# Patient Record
Sex: FEMALE | URBAN - METROPOLITAN AREA
[De-identification: names, ages, dates, MRNs, and addresses within clinical notes are randomized per-mention and may not be internally consistent; named-entity substitution may affect disease eponyms.]

---

## 2021-07-13 ENCOUNTER — APPOINTMENT (RX ONLY)
Dept: URBAN - METROPOLITAN AREA CLINIC 150 | Facility: CLINIC | Age: 46
Setting detail: DERMATOLOGY
End: 2021-07-13

## 2021-07-13 DIAGNOSIS — L29.89 OTHER PRURITUS: ICD-10-CM

## 2021-07-13 DIAGNOSIS — L29.8 OTHER PRURITUS: ICD-10-CM

## 2021-07-13 DIAGNOSIS — L50.1 IDIOPATHIC URTICARIA: ICD-10-CM

## 2021-07-13 DIAGNOSIS — L50.3 DERMATOGRAPHIC URTICARIA: ICD-10-CM

## 2021-07-13 PROCEDURE — ? COUNSELING

## 2021-07-13 PROCEDURE — 99204 OFFICE O/P NEW MOD 45 MIN: CPT

## 2021-07-13 PROCEDURE — ? TREATMENT REGIMEN

## 2021-07-13 PROCEDURE — ? PRESCRIPTION

## 2021-07-13 RX ORDER — CETIRIZINE HCL 1 MG/ML
SOLUTION, ORAL ORAL QD
Qty: 30 | Refills: 3 | Status: ERX | COMMUNITY
Start: 2021-07-13

## 2021-07-13 RX ORDER — PREDNISONE 20 MG/1
TABLET ORAL
Qty: 8 | Refills: 0 | Status: ERX | COMMUNITY
Start: 2021-07-13

## 2021-07-13 RX ADMIN — Medication: at 00:00

## 2021-07-13 RX ADMIN — PREDNISONE: 20 TABLET ORAL at 00:00

## 2021-07-13 ASSESSMENT — LOCATION DETAILED DESCRIPTION DERM
LOCATION DETAILED: LEFT ANTERIOR PROXIMAL THIGH
LOCATION DETAILED: RIGHT ANTERIOR PROXIMAL THIGH
LOCATION DETAILED: LEFT INFERIOR MEDIAL MIDBACK
LOCATION DETAILED: LEFT PROXIMAL POSTERIOR UPPER ARM
LOCATION DETAILED: RIGHT PROXIMAL POSTERIOR UPPER ARM

## 2021-07-13 ASSESSMENT — LOCATION SIMPLE DESCRIPTION DERM
LOCATION SIMPLE: RIGHT THIGH
LOCATION SIMPLE: RIGHT POSTERIOR UPPER ARM
LOCATION SIMPLE: LEFT POSTERIOR UPPER ARM
LOCATION SIMPLE: LEFT THIGH
LOCATION SIMPLE: LEFT LOWER BACK

## 2021-07-13 ASSESSMENT — LOCATION ZONE DERM
LOCATION ZONE: ARM
LOCATION ZONE: TRUNK
LOCATION ZONE: LEG

## 2021-07-13 NOTE — HPI: HIVES (URTICARIA)
How Severe Are Your Hives?: moderate
Please Select The Phrase That Best Describes Your Hives.: individual welts do not stay in the same place for more than 24 hours
Is This A New Presentation, Or A Follow-Up?: Hives
Additional History: Patient was seen in the E.R. last night. She was given oral steroids as well as cetirizine. She has not picked up these scripts yet. She was seen at [facility] over the last three weeks as well.

## 2021-07-13 NOTE — PROCEDURE: COUNSELING
Detail Level: Zone
Patient Specific Counseling (Will Not Stick From Patient To Patient): .\\n07/13/21 \\nPatient was been in and out of that emergency room for the hives & itching within the last 2-3 weeks although she states she has been dealing with this on and off for 1 year. Oral steroids and cetirizine has been prescribed by an ER MD at [facility] last night. Patient was also seen by [facility] over the past few weeks. We will request that these records be sent to our office. She did not  these prescriptions since she was seen last night and would prefer they be sent to a local pharmacy instead of [place]. I discussed with her the importance of only taking what is prescribed to her. \\n\\nI also recommended allergy test at a later date if she would like.

## 2021-08-25 ENCOUNTER — APPOINTMENT (RX ONLY)
Dept: URBAN - METROPOLITAN AREA CLINIC 150 | Facility: CLINIC | Age: 46
Setting detail: DERMATOLOGY
End: 2021-08-25

## 2021-08-25 DIAGNOSIS — L50.1 IDIOPATHIC URTICARIA: ICD-10-CM | Status: STABLE

## 2021-08-25 DIAGNOSIS — Z71.89 OTHER SPECIFIED COUNSELING: ICD-10-CM

## 2021-08-25 DIAGNOSIS — L50.3 DERMATOGRAPHIC URTICARIA: ICD-10-CM

## 2021-08-25 DIAGNOSIS — L29.8 OTHER PRURITUS: ICD-10-CM

## 2021-08-25 DIAGNOSIS — L29.89 OTHER PRURITUS: ICD-10-CM

## 2021-08-25 PROCEDURE — ? VENIPUNCTURE

## 2021-08-25 PROCEDURE — 36415 COLL VENOUS BLD VENIPUNCTURE: CPT

## 2021-08-25 PROCEDURE — ? PRESCRIPTION

## 2021-08-25 PROCEDURE — ? COUNSELING

## 2021-08-25 PROCEDURE — 99214 OFFICE O/P EST MOD 30 MIN: CPT | Mod: 25

## 2021-08-25 RX ORDER — TRIAMCINOLONE ACETONIDE 1 MG/G
CREAM TOPICAL BID
Qty: 1 | Refills: 2 | Status: ERX | COMMUNITY
Start: 2021-08-25

## 2021-08-25 RX ADMIN — TRIAMCINOLONE ACETONIDE: 1 CREAM TOPICAL at 00:00

## 2021-08-25 ASSESSMENT — LOCATION DETAILED DESCRIPTION DERM
LOCATION DETAILED: LEFT ANTERIOR PROXIMAL THIGH
LOCATION DETAILED: LEFT INFERIOR MEDIAL MIDBACK
LOCATION DETAILED: RIGHT ANTERIOR PROXIMAL THIGH
LOCATION DETAILED: RIGHT PROXIMAL POSTERIOR UPPER ARM
LOCATION DETAILED: LEFT PROXIMAL POSTERIOR UPPER ARM

## 2021-08-25 ASSESSMENT — LOCATION SIMPLE DESCRIPTION DERM
LOCATION SIMPLE: LEFT LOWER BACK
LOCATION SIMPLE: RIGHT THIGH
LOCATION SIMPLE: LEFT POSTERIOR UPPER ARM
LOCATION SIMPLE: LEFT THIGH
LOCATION SIMPLE: RIGHT POSTERIOR UPPER ARM

## 2021-08-25 ASSESSMENT — LOCATION ZONE DERM
LOCATION ZONE: LEG
LOCATION ZONE: ARM
LOCATION ZONE: TRUNK

## 2021-08-25 NOTE — PROCEDURE: VENIPUNCTURE
Detail Level: None
Bill 04771 For Specimen Handling/Conveyance To Laboratory?: no
Number Of Tubes Drawn: 6
Venipuncture Paragraph: An alcohol pad was applied to the venipuncture site. Venipuncture was performed using a butterfly needle. Pressure and a bandaid was applied to the site. No complications were noted.

## 2021-08-25 NOTE — PROCEDURE: COUNSELING
Detail Level: Zone
Detail Level: Detailed
Patient Specific Counseling (Will Not Stick From Patient To Patient): .\\n07/13/21 \\nPjohnny was been in and out of that emergency room for the hives & itching within the last 2-3 weeks although she states she has been dealing with this on and off for 1 year. Oral steroids and cetirizine has been prescribed by an ER MD at [facility] last night. Patient was also seen by [facility] over the past few weeks. We will request that these records be sent to our office. She did not  these prescriptions since she was seen last night and would prefer they be sent to a local pharmacy instead of [place]. I discussed with her the importance of only taking what is prescribed to her. \\n\\nI also recommended allergy test at a later date if she would like.\\n\\n8/25/21\\Danny is doing much better today although she is still itchy. Suggested we can give her a topical steroid and see if this helps. She is now off oral prednisone and this is concerning for her because she thinks her symptoms will come right back. \\n\\nOpted to do more extensive blood work at this time since she is still experiencing symptoms. She is taking Benadryl at night as needed for itch as well as a non sedating antihistamine during the day. \\Micheline was recently diagnosed with Covid in early August - and she will see her PCP for a chest X-ray and follow up visit on Sept 15th. Although these symptoms began long before her Covid diagnosis. She was also recently diagnosed with a UTI and treated with keflex.

## 2021-09-15 ENCOUNTER — APPOINTMENT (RX ONLY)
Dept: URBAN - METROPOLITAN AREA CLINIC 150 | Facility: CLINIC | Age: 46
Setting detail: DERMATOLOGY
End: 2021-09-15

## 2021-09-15 DIAGNOSIS — L50.1 IDIOPATHIC URTICARIA: ICD-10-CM | Status: STABLE

## 2021-09-15 DIAGNOSIS — L29.89 OTHER PRURITUS: ICD-10-CM | Status: STABLE

## 2021-09-15 DIAGNOSIS — Z71.89 OTHER SPECIFIED COUNSELING: ICD-10-CM

## 2021-09-15 DIAGNOSIS — L29.8 OTHER PRURITUS: ICD-10-CM | Status: STABLE

## 2021-09-15 PROCEDURE — ? REFERRAL CORRESPONDENCE

## 2021-09-15 PROCEDURE — 99213 OFFICE O/P EST LOW 20 MIN: CPT

## 2021-09-15 PROCEDURE — ? DEFER

## 2021-09-15 PROCEDURE — ? COUNSELING

## 2021-09-15 ASSESSMENT — LOCATION SIMPLE DESCRIPTION DERM
LOCATION SIMPLE: LEFT THIGH
LOCATION SIMPLE: RIGHT THIGH
LOCATION SIMPLE: LEFT POSTERIOR UPPER ARM
LOCATION SIMPLE: RIGHT POSTERIOR UPPER ARM

## 2021-09-15 ASSESSMENT — LOCATION DETAILED DESCRIPTION DERM
LOCATION DETAILED: LEFT PROXIMAL POSTERIOR UPPER ARM
LOCATION DETAILED: RIGHT ANTERIOR PROXIMAL THIGH
LOCATION DETAILED: LEFT ANTERIOR PROXIMAL THIGH
LOCATION DETAILED: RIGHT PROXIMAL POSTERIOR UPPER ARM

## 2021-09-15 ASSESSMENT — LOCATION ZONE DERM
LOCATION ZONE: LEG
LOCATION ZONE: ARM

## 2021-09-15 NOTE — PROCEDURE: DEFER
Procedure To Be Performed At Next Visit: Other
Detail Level: Detailed
Introduction Text (Please End With A Colon): The following procedure was deferred: Will send referral to Rheumatologist for joint pain, positive RA factor.

## 2021-09-15 NOTE — PROCEDURE: COUNSELING
Detail Level: Detailed
Patient Specific Counseling (Will Not Stick From Patient To Patient): .\\n09/15/21\\nToday, we discussed patients blood extensively today in the clinic and patient was given copies in the clini. We discussed her positive RA . She feels things are improving her with itching and hives, although she is experiencing knee and shoulder pain/ aches. She’s managing her pain OTC acetaminophen, ibuprofen, and/ or pain reliever. We discussed patient to follow up with her PCP in [place] at the end of October. \\n\\nBlood work labs given to patient. We also discussed referring patient to rheumatologist, preferably [place]. She would like to see a rheumatologist as her joint pain has been ongoing. \\n\\n8/25/21\\nPjohnny is doing much better today although she is still itchy. Suggested we can give her a topical steroid and see if this helps. She is now off oral prednisone and this is concerning for her because she thinks her symptoms will come right back. \\n\\nOpted to do more extensive blood work at this time since she is still experiencing symptoms. She is taking Benadryl at night as needed for itch as well as a non sedating antihistamine during the day. \\Micheline was recently diagnosed with Covid in early August - and she will see her PCP for a chest X-ray and follow up visit on Sept 15th. Although these symptoms began long before her Covid diagnosis. She was also recently diagnosed with a UTI and treated with keflex.\\n\\n07/13/21 \\nPatient was been in and out of that emergency room for the hives & itching within the last 2-3 weeks although she states she has been dealing with this on and off for 1 year. Oral steroids and cetirizine has been prescribed by an ER MD at [facility] last night. Patient was also seen by [facility] over the past few weeks. We will request that these records be sent to our office. She did not  these prescriptions since she was seen last night and would prefer they be sent to a local pharmacy instead of [place]. I discussed with her the importance of only taking what is prescribed to her. \\n\\nI also recommended allergy test at a later date if she would like.
Detail Level: Zone